# Patient Record
Sex: FEMALE | Race: WHITE
[De-identification: names, ages, dates, MRNs, and addresses within clinical notes are randomized per-mention and may not be internally consistent; named-entity substitution may affect disease eponyms.]

---

## 2018-01-02 ENCOUNTER — HOSPITAL ENCOUNTER (OUTPATIENT)
Dept: HOSPITAL 80 - FIMAGING | Age: 38
End: 2018-01-02
Attending: OBSTETRICS & GYNECOLOGY
Payer: COMMERCIAL

## 2018-01-02 DIAGNOSIS — O34.219: ICD-10-CM

## 2018-01-02 DIAGNOSIS — O99.512: ICD-10-CM

## 2018-01-02 DIAGNOSIS — O99.342: ICD-10-CM

## 2018-01-02 DIAGNOSIS — Z3A.26: ICD-10-CM

## 2018-01-02 DIAGNOSIS — O09.522: Primary | ICD-10-CM

## 2018-01-02 DIAGNOSIS — J45.909: ICD-10-CM

## 2018-01-02 DIAGNOSIS — O99.212: ICD-10-CM

## 2018-01-02 DIAGNOSIS — O23.42: ICD-10-CM

## 2018-02-13 ENCOUNTER — HOSPITAL ENCOUNTER (OUTPATIENT)
Dept: HOSPITAL 80 - FLD | Age: 38
Setting detail: OBSERVATION
Discharge: HOME | End: 2018-02-13
Attending: OBSTETRICS & GYNECOLOGY | Admitting: OBSTETRICS & GYNECOLOGY
Payer: COMMERCIAL

## 2018-02-13 DIAGNOSIS — O09.213: Primary | ICD-10-CM

## 2018-02-13 DIAGNOSIS — R10.13: ICD-10-CM

## 2018-02-13 DIAGNOSIS — R51: ICD-10-CM

## 2018-02-13 DIAGNOSIS — Z3A.33: ICD-10-CM

## 2018-02-13 LAB — PLATELET # BLD: 316 10^3/UL (ref 150–400)

## 2018-02-13 PROCEDURE — G0378 HOSPITAL OBSERVATION PER HR: HCPCS

## 2018-02-13 NOTE — GCON
[f rep st]



                                                                    CONSULTATION





OB TRIAGE NOTE.



DATE OF CONSULTATION:  2018





DIAGNOSIS:  Intrauterine pregnancy at 33 weeks' gestation with a history of severe preeclampsia, HELL
P syndrome, and headache, rule out preeclampsia.



HISTORY OF PRESENT ILLNESS:  Patient is a 37-year-old  2, para 0-1-0-1 at 33 weeks' gestation.
  She is a patient of Dr. Breaux at [facility].  She has a history of sever
e preeclampsia and HELLP syndrome, delivered a G1 at 27 weeks' gestation, and she has been followed v
nathan closely in this pregnancy.  Her pregnancy has been complicated by a decrease in the fetal size.  
Initial estimated fetal weight on this baby was 94th percentile, most recent followup was dropped to 
the 14th, suspicious for growth restriction and possible preeclampsia.  Today, she presented with hea
dache and epigastric pain.  The patient was in [place], and Dr. Breaux instructed her to come to us f
or evaluation to rule out worsening preeclampsia.  On evaluation, patient's blood pressures were all 
normal in the one teens over 60s to 80s.  Fetal heart tones were 140s, reactive, moderate variability
, category 1, and she had no obstetrical complaints.  No contractions.  Labs were normal for preeclam
psia.  White blood cell 11.13, hemoglobin 8.6, hematocrit 25.8, platelets 316.  Chemistry: BUN is 6, 
creatinine 0.5, uric acid 4.3.  AST 12, ALT 21, .  Her urine P:C ratio was normal of 0.036.  P
atient was discharged home with instructions to follow up with Dr. Breaux as previously scheduled, an
d to call with any concerning symptoms for preeclampsia.





Job #:  [id number]